# Patient Record
Sex: MALE | NOT HISPANIC OR LATINO | Employment: OTHER | URBAN - METROPOLITAN AREA
[De-identification: names, ages, dates, MRNs, and addresses within clinical notes are randomized per-mention and may not be internally consistent; named-entity substitution may affect disease eponyms.]

---

## 2021-01-27 ENCOUNTER — OFFICE VISIT (OUTPATIENT)
Dept: URGENT CARE | Facility: CLINIC | Age: 71
End: 2021-01-27
Payer: MEDICARE

## 2021-01-27 VITALS
DIASTOLIC BLOOD PRESSURE: 100 MMHG | SYSTOLIC BLOOD PRESSURE: 158 MMHG | HEART RATE: 90 BPM | TEMPERATURE: 98 F | WEIGHT: 225.4 LBS | RESPIRATION RATE: 20 BRPM | OXYGEN SATURATION: 98 %

## 2021-01-27 DIAGNOSIS — I10 HYPERTENSION, UNSPECIFIED TYPE: Primary | ICD-10-CM

## 2021-01-27 PROCEDURE — G0463 HOSPITAL OUTPT CLINIC VISIT: HCPCS | Performed by: PHYSICIAN ASSISTANT

## 2021-01-27 PROCEDURE — 99203 OFFICE O/P NEW LOW 30 MIN: CPT | Performed by: PHYSICIAN ASSISTANT

## 2021-01-27 RX ORDER — ASPIRIN 81 MG/1
81 TABLET, CHEWABLE ORAL DAILY
COMMUNITY

## 2021-01-27 RX ORDER — SIMVASTATIN 40 MG
40 TABLET ORAL
COMMUNITY

## 2021-01-27 RX ORDER — METOPROLOL SUCCINATE 50 MG/1
50 TABLET, EXTENDED RELEASE ORAL DAILY
COMMUNITY

## 2021-01-27 NOTE — PROGRESS NOTES
[address] [employer] [address]          NAME: Edith Young is a 79 y o  male  : 1950    MRN: [id number]  DATE: 2021  TIME: 11:14 AM    Assessment and Plan   Hypertension, unspecified type [I10]  1  Hypertension, unspecified type         Patient Instructions   Educated on concerning symptoms such as blurred vision, HA, numbness, CP or SOB and to present to ER if these develop  Over 180/110 to present to ER  Otherwise to call Cardio today for further workup and possible med adjustment  Patient agreeable to plan  To present to the ER if symptoms worsen  Chief Complaint     Chief Complaint   Patient presents with    Hypertension     on BP meds , took BP in am elevated denies Blurred vision , HA, numbness, tingling          History of Present Illness   Edith Young presents to the clinic c/o    Hx of triple bipass 1197   2 stents placed   one stent placed  Follows with [facility] cardio  Noticed elevated BP readings ranging from 180/107 to 143/92 yesterday and today  Takes metroprol  Hypertension  This is a chronic problem  The current episode started today  The problem has been gradually worsening since onset  Pertinent negatives include no anxiety, blurred vision, chest pain, headaches, malaise/fatigue, neck pain, orthopnea, palpitations, peripheral edema, PND, shortness of breath or sweats  Past treatments include ACE inhibitors  Review of Systems   Review of Systems   Constitutional: Negative for chills, diaphoresis, fatigue, fever and malaise/fatigue  HENT: Negative for congestion, ear discharge, ear pain and facial swelling  Eyes: Negative for blurred vision, photophobia, pain, discharge, redness, itching and visual disturbance  Respiratory: Negative for apnea, cough, chest tightness, shortness of breath and wheezing  Cardiovascular: Negative for chest pain, palpitations, orthopnea and PND  Gastrointestinal: Negative for abdominal pain     Musculoskeletal: Negative for neck pain  Skin: Negative for color change, rash and wound  Neurological: Negative for dizziness and headaches  Hematological: Negative for adenopathy           Current Medications     Long-Term Medications   Medication Sig Dispense Refill    aspirin 81 mg chewable tablet Chew 81 mg daily      metoprolol succinate (TOPROL-XL) 50 mg 24 hr tablet Take 50 mg by mouth daily      simvastatin (ZOCOR) 40 mg tablet Take 40 mg by mouth daily at bedtime         Current Allergies     Allergies as of 2021    (No Known Allergies)            The following portions of the patient's history were reviewed and updated as appropriate: allergies, current medications, past family history, past medical history, past social history, past surgical history and problem list   Past Medical History:   Diagnosis Date    Hypercholesterolemia     Hypertension      Past Surgical History:   Procedure Laterality Date    CORONARY ARTERY BYPASS GRAFT       Social History     Socioeconomic History    Marital status: /Civil Union     Spouse name: Not on file    Number of children: Not on file    Years of education: Not on file    Highest education level: Not on file   Occupational History    Not on file   Social Needs    Financial resource strain: Not on file    Food insecurity     Worry: Not on file     Inability: Not on file   [employer] needs     Medical: Not on file     Non-medical: Not on file   Tobacco Use    Smoking status: Former Smoker     Quit date:      Years since quittin 0    Smokeless tobacco: Never Used   Substance and Sexual Activity    Alcohol use: Yes     Frequency: Monthly or less    Drug use: Never    Sexual activity: Not on file   Lifestyle    Physical activity     Days per week: Not on file     Minutes per session: Not on file    Stress: Not on file   Relationships    Social connections     Talks on phone: Not on file     Gets together: Not on file     Attends Sabianism service: Not on file     Active member of club or organization: Not on file     Attends meetings of clubs or organizations: Not on file     Relationship status: Not on file    Intimate partner violence     Fear of current or ex partner: Not on file     Emotionally abused: Not on file     Physically abused: Not on file     Forced sexual activity: Not on file   Other Topics Concern    Not on file   Social History Narrative    Not on file       Objective   /100   Pulse 90   Temp 98 °F (36 7 °C)   Resp 20   Wt 102 kg (225 lb 6 4 oz)   SpO2 98%      Physical Exam     Physical Exam  Vitals signs and nursing note reviewed  Constitutional:       General: He is not in acute distress  Appearance: He is well-developed  He is not diaphoretic  HENT:      Head: Normocephalic and atraumatic  Right Ear: External ear normal       Left Ear: External ear normal       Nose: Nose normal       Mouth/Throat:      Mouth: Mucous membranes are moist       Pharynx: Oropharynx is clear  No oropharyngeal exudate or posterior oropharyngeal erythema  Eyes:      General: No scleral icterus  Right eye: No discharge  Left eye: No discharge  Conjunctiva/sclera: Conjunctivae normal       Pupils: Pupils are equal, round, and reactive to light  Cardiovascular:      Rate and Rhythm: Normal rate and regular rhythm  Heart sounds: Normal heart sounds  No murmur  No friction rub  No gallop  Pulmonary:      Effort: Pulmonary effort is normal  No respiratory distress  Breath sounds: Normal breath sounds  No decreased breath sounds, wheezing, rhonchi or rales  Abdominal:      General: Bowel sounds are normal       Palpations: Abdomen is soft  Tenderness: There is no abdominal tenderness  Skin:     General: Skin is warm and dry  Coloration: Skin is not pale  Findings: No erythema or rash     Neurological:      Mental Status: He is alert and oriented to person, place, and time       Cranial Nerves: Cranial nerves are intact  Sensory: Sensation is intact  Coordination: Coordination is intact  Psychiatric:         Behavior: Behavior normal          Thought Content:  Thought content normal          Judgment: Judgment normal          Tonia Norris PA-C

## 2021-02-14 ENCOUNTER — APPOINTMENT (EMERGENCY)
Dept: CT IMAGING | Facility: HOSPITAL | Age: 71
End: 2021-02-14
Payer: MEDICARE

## 2021-02-14 ENCOUNTER — HOSPITAL ENCOUNTER (OUTPATIENT)
Facility: HOSPITAL | Age: 71
Setting detail: OBSERVATION
Discharge: HOME | End: 2021-02-15
Attending: EMERGENCY MEDICINE | Admitting: SURGERY
Payer: MEDICARE

## 2021-02-14 DIAGNOSIS — K37 APPENDICITIS: Primary | ICD-10-CM

## 2021-02-14 LAB
ALBUMIN SERPL BCP-MCNC: 3.5 G/DL (ref 3.5–5)
ALP SERPL-CCNC: 90 U/L (ref 46–116)
ALT SERPL W P-5'-P-CCNC: 33 U/L (ref 12–78)
ANION GAP SERPL CALCULATED.3IONS-SCNC: 9 MMOL/L (ref 4–13)
AST SERPL W P-5'-P-CCNC: 14 U/L (ref 5–45)
BACTERIA UR QL AUTO: ABNORMAL /HPF
BASOPHILS # BLD AUTO: 0.05 THOUSANDS/ΜL (ref 0–0.1)
BASOPHILS NFR BLD AUTO: 0 % (ref 0–1)
BILIRUB SERPL-MCNC: 0.44 MG/DL (ref 0.2–1)
BILIRUB UR QL STRIP: NEGATIVE
BUN SERPL-MCNC: 17 MG/DL (ref 5–25)
CALCIUM SERPL-MCNC: 9.3 MG/DL (ref 8.3–10.1)
CHLORIDE SERPL-SCNC: 99 MMOL/L (ref 100–108)
CLARITY UR: CLEAR
CO2 SERPL-SCNC: 29 MMOL/L (ref 21–32)
COLOR UR: YELLOW
CREAT SERPL-MCNC: 1.05 MG/DL (ref 0.6–1.3)
EOSINOPHIL # BLD AUTO: 0.08 THOUSAND/ΜL (ref 0–0.61)
EOSINOPHIL NFR BLD AUTO: 1 % (ref 0–6)
ERYTHROCYTE [DISTWIDTH] IN BLOOD BY AUTOMATED COUNT: 13.3 % (ref 11.6–15.1)
GFR SERPL CREATININE-BSD FRML MDRD: 72 ML/MIN/1.73SQ M
GLUCOSE SERPL-MCNC: 115 MG/DL (ref 65–140)
GLUCOSE UR STRIP-MCNC: NEGATIVE MG/DL
HCT VFR BLD AUTO: 42.7 % (ref 36.5–49.3)
HGB BLD-MCNC: 13.5 G/DL (ref 12–17)
HGB UR QL STRIP.AUTO: ABNORMAL
IMM GRANULOCYTES # BLD AUTO: 0.09 THOUSAND/UL (ref 0–0.2)
IMM GRANULOCYTES NFR BLD AUTO: 1 % (ref 0–2)
KETONES UR STRIP-MCNC: NEGATIVE MG/DL
LEUKOCYTE ESTERASE UR QL STRIP: NEGATIVE
LIPASE SERPL-CCNC: 102 U/L (ref 73–393)
LYMPHOCYTES # BLD AUTO: 1.66 THOUSANDS/ΜL (ref 0.6–4.47)
LYMPHOCYTES NFR BLD AUTO: 13 % (ref 14–44)
MCH RBC QN AUTO: 28.7 PG (ref 26.8–34.3)
MCHC RBC AUTO-ENTMCNC: 31.6 G/DL (ref 31.4–37.4)
MCV RBC AUTO: 91 FL (ref 82–98)
MONOCYTES # BLD AUTO: 1.43 THOUSAND/ΜL (ref 0.17–1.22)
MONOCYTES NFR BLD AUTO: 11 % (ref 4–12)
NEUTROPHILS # BLD AUTO: 9.41 THOUSANDS/ΜL (ref 1.85–7.62)
NEUTS SEG NFR BLD AUTO: 74 % (ref 43–75)
NITRITE UR QL STRIP: NEGATIVE
NON-SQ EPI CELLS URNS QL MICRO: ABNORMAL /HPF
NRBC BLD AUTO-RTO: 0 /100 WBCS
PH UR STRIP.AUTO: 6 [PH] (ref 4.5–8)
PLATELET # BLD AUTO: 240 THOUSANDS/UL (ref 149–390)
PMV BLD AUTO: 10.8 FL (ref 8.9–12.7)
POTASSIUM SERPL-SCNC: 4.4 MMOL/L (ref 3.5–5.3)
PROT SERPL-MCNC: 8 G/DL (ref 6.4–8.2)
PROT UR STRIP-MCNC: NEGATIVE MG/DL
RBC # BLD AUTO: 4.7 MILLION/UL (ref 3.88–5.62)
RBC #/AREA URNS AUTO: ABNORMAL /HPF
SODIUM SERPL-SCNC: 137 MMOL/L (ref 136–145)
SP GR UR STRIP.AUTO: 1.02 (ref 1–1.03)
UROBILINOGEN UR QL STRIP.AUTO: 0.2 E.U./DL
WBC # BLD AUTO: 12.72 THOUSAND/UL (ref 4.31–10.16)
WBC #/AREA URNS AUTO: ABNORMAL /HPF

## 2021-02-14 PROCEDURE — 80053 COMPREHEN METABOLIC PANEL: CPT | Performed by: PHYSICIAN ASSISTANT

## 2021-02-14 PROCEDURE — 99285 EMERGENCY DEPT VISIT HI MDM: CPT | Performed by: PHYSICIAN ASSISTANT

## 2021-02-14 PROCEDURE — 74177 CT ABD & PELVIS W/CONTRAST: CPT

## 2021-02-14 PROCEDURE — 96374 THER/PROPH/DIAG INJ IV PUSH: CPT

## 2021-02-14 PROCEDURE — 1123F ACP DISCUSS/DSCN MKR DOCD: CPT | Performed by: PHYSICIAN ASSISTANT

## 2021-02-14 PROCEDURE — G1004 CDSM NDSC: HCPCS

## 2021-02-14 PROCEDURE — 83690 ASSAY OF LIPASE: CPT | Performed by: PHYSICIAN ASSISTANT

## 2021-02-14 PROCEDURE — 85025 COMPLETE CBC W/AUTO DIFF WBC: CPT | Performed by: PHYSICIAN ASSISTANT

## 2021-02-14 PROCEDURE — 99285 EMERGENCY DEPT VISIT HI MDM: CPT

## 2021-02-14 PROCEDURE — 81001 URINALYSIS AUTO W/SCOPE: CPT

## 2021-02-14 PROCEDURE — 36415 COLL VENOUS BLD VENIPUNCTURE: CPT | Performed by: PHYSICIAN ASSISTANT

## 2021-02-14 RX ORDER — KETOROLAC TROMETHAMINE 30 MG/ML
15 INJECTION, SOLUTION INTRAMUSCULAR; INTRAVENOUS ONCE
Status: COMPLETED | OUTPATIENT
Start: 2021-02-14 | End: 2021-02-14

## 2021-02-14 RX ADMIN — IOHEXOL 100 ML: 350 INJECTION, SOLUTION INTRAVENOUS at 22:58

## 2021-02-14 RX ADMIN — KETOROLAC TROMETHAMINE 15 MG: 30 INJECTION, SOLUTION INTRAMUSCULAR; INTRAVENOUS at 20:51

## 2021-02-14 RX ADMIN — IOHEXOL 50 ML: 240 INJECTION, SOLUTION INTRATHECAL; INTRAVASCULAR; INTRAVENOUS; ORAL at 22:58

## 2021-02-15 ENCOUNTER — ANESTHESIA EVENT (OUTPATIENT)
Dept: PERIOP | Facility: HOSPITAL | Age: 71
End: 2021-02-15
Payer: MEDICARE

## 2021-02-15 ENCOUNTER — ANESTHESIA (OUTPATIENT)
Dept: PERIOP | Facility: HOSPITAL | Age: 71
End: 2021-02-15
Payer: MEDICARE

## 2021-02-15 VITALS
HEART RATE: 102 BPM | RESPIRATION RATE: 18 BRPM | DIASTOLIC BLOOD PRESSURE: 68 MMHG | SYSTOLIC BLOOD PRESSURE: 138 MMHG | OXYGEN SATURATION: 100 % | WEIGHT: 220.02 LBS | TEMPERATURE: 98 F

## 2021-02-15 VITALS — HEART RATE: 99 BPM

## 2021-02-15 PROBLEM — K37 APPENDICITIS: Status: ACTIVE | Noted: 2021-02-15

## 2021-02-15 PROBLEM — E78.5 HYPERLIPIDEMIA: Status: ACTIVE | Noted: 2021-02-15

## 2021-02-15 PROBLEM — I25.810 CORONARY ARTERY DISEASE INVOLVING CORONARY BYPASS GRAFT OF NATIVE HEART WITHOUT ANGINA PECTORIS: Status: ACTIVE | Noted: 2021-02-15

## 2021-02-15 PROCEDURE — 88304 TISSUE EXAM BY PATHOLOGIST: CPT | Performed by: CLINICAL MEDICAL LABORATORY

## 2021-02-15 PROCEDURE — 99235 HOSP IP/OBS SAME DATE MOD 70: CPT | Performed by: SURGERY

## 2021-02-15 PROCEDURE — 44970 LAPAROSCOPY APPENDECTOMY: CPT | Performed by: SURGERY

## 2021-02-15 PROCEDURE — 99024 POSTOP FOLLOW-UP VISIT: CPT | Performed by: SURGERY

## 2021-02-15 RX ORDER — FENTANYL CITRATE 50 UG/ML
INJECTION, SOLUTION INTRAMUSCULAR; INTRAVENOUS AS NEEDED
Status: DISCONTINUED | OUTPATIENT
Start: 2021-02-15 | End: 2021-02-15

## 2021-02-15 RX ORDER — ONDANSETRON 2 MG/ML
4 INJECTION INTRAMUSCULAR; INTRAVENOUS ONCE AS NEEDED
Status: DISCONTINUED | OUTPATIENT
Start: 2021-02-15 | End: 2021-02-15 | Stop reason: HOSPADM

## 2021-02-15 RX ORDER — METRONIDAZOLE 500 MG/1
500 TABLET ORAL EVERY 8 HOURS SCHEDULED
Qty: 21 TABLET | Refills: 0 | Status: SHIPPED | OUTPATIENT
Start: 2021-02-15 | End: 2021-02-22

## 2021-02-15 RX ORDER — AMOXICILLIN AND CLAVULANATE POTASSIUM 875; 125 MG/1; MG/1
1 TABLET, FILM COATED ORAL EVERY 12 HOURS SCHEDULED
Qty: 14 TABLET | Refills: 0 | Status: SHIPPED | OUTPATIENT
Start: 2021-02-15 | End: 2021-02-22

## 2021-02-15 RX ORDER — PRAVASTATIN SODIUM 40 MG
80 TABLET ORAL
Status: DISCONTINUED | OUTPATIENT
Start: 2021-02-15 | End: 2021-02-15 | Stop reason: HOSPADM

## 2021-02-15 RX ORDER — PROPOFOL 10 MG/ML
INJECTION, EMULSION INTRAVENOUS AS NEEDED
Status: DISCONTINUED | OUTPATIENT
Start: 2021-02-15 | End: 2021-02-15

## 2021-02-15 RX ORDER — SODIUM CHLORIDE, SODIUM LACTATE, POTASSIUM CHLORIDE, CALCIUM CHLORIDE 600; 310; 30; 20 MG/100ML; MG/100ML; MG/100ML; MG/100ML
125 INJECTION, SOLUTION INTRAVENOUS CONTINUOUS
Status: DISCONTINUED | OUTPATIENT
Start: 2021-02-15 | End: 2021-02-15 | Stop reason: HOSPADM

## 2021-02-15 RX ORDER — OXYCODONE HYDROCHLORIDE 5 MG/1
5 TABLET ORAL EVERY 4 HOURS PRN
Status: DISCONTINUED | OUTPATIENT
Start: 2021-02-15 | End: 2021-02-15 | Stop reason: HOSPADM

## 2021-02-15 RX ORDER — CEFAZOLIN SODIUM 2 G/50ML
SOLUTION INTRAVENOUS AS NEEDED
Status: DISCONTINUED | OUTPATIENT
Start: 2021-02-15 | End: 2021-02-15

## 2021-02-15 RX ORDER — ACETAMINOPHEN 325 MG/1
650 TABLET ORAL EVERY 6 HOURS PRN
Status: DISCONTINUED | OUTPATIENT
Start: 2021-02-15 | End: 2021-02-15 | Stop reason: HOSPADM

## 2021-02-15 RX ORDER — BUPIVACAINE HYDROCHLORIDE 2.5 MG/ML
INJECTION, SOLUTION EPIDURAL; INFILTRATION; INTRACAUDAL AS NEEDED
Status: DISCONTINUED | OUTPATIENT
Start: 2021-02-15 | End: 2021-02-15 | Stop reason: HOSPADM

## 2021-02-15 RX ORDER — OXYCODONE HYDROCHLORIDE 5 MG/1
5 TABLET ORAL EVERY 4 HOURS PRN
Qty: 20 TABLET | Refills: 0 | Status: SHIPPED | OUTPATIENT
Start: 2021-02-15 | End: 2021-02-25

## 2021-02-15 RX ORDER — CEFAZOLIN SODIUM 2 G/50ML
2000 SOLUTION INTRAVENOUS EVERY 8 HOURS
Status: DISCONTINUED | OUTPATIENT
Start: 2021-02-15 | End: 2021-02-15 | Stop reason: HOSPADM

## 2021-02-15 RX ORDER — METOPROLOL SUCCINATE 50 MG/1
50 TABLET, EXTENDED RELEASE ORAL DAILY
Status: DISCONTINUED | OUTPATIENT
Start: 2021-02-15 | End: 2021-02-15 | Stop reason: HOSPADM

## 2021-02-15 RX ORDER — SODIUM CHLORIDE, SODIUM LACTATE, POTASSIUM CHLORIDE, CALCIUM CHLORIDE 600; 310; 30; 20 MG/100ML; MG/100ML; MG/100ML; MG/100ML
INJECTION, SOLUTION INTRAVENOUS CONTINUOUS PRN
Status: DISCONTINUED | OUTPATIENT
Start: 2021-02-15 | End: 2021-02-15

## 2021-02-15 RX ORDER — ONDANSETRON 2 MG/ML
INJECTION INTRAMUSCULAR; INTRAVENOUS AS NEEDED
Status: DISCONTINUED | OUTPATIENT
Start: 2021-02-15 | End: 2021-02-15

## 2021-02-15 RX ORDER — HYDROMORPHONE HCL/PF 1 MG/ML
0.5 SYRINGE (ML) INJECTION
Status: DISCONTINUED | OUTPATIENT
Start: 2021-02-15 | End: 2021-02-15 | Stop reason: HOSPADM

## 2021-02-15 RX ORDER — SODIUM CHLORIDE 9 MG/ML
INJECTION, SOLUTION INTRAVENOUS CONTINUOUS PRN
Status: DISCONTINUED | OUTPATIENT
Start: 2021-02-15 | End: 2021-02-15

## 2021-02-15 RX ORDER — MIDAZOLAM HYDROCHLORIDE 2 MG/2ML
INJECTION, SOLUTION INTRAMUSCULAR; INTRAVENOUS AS NEEDED
Status: DISCONTINUED | OUTPATIENT
Start: 2021-02-15 | End: 2021-02-15

## 2021-02-15 RX ORDER — FENTANYL CITRATE/PF 50 MCG/ML
50 SYRINGE (ML) INJECTION
Status: DISCONTINUED | OUTPATIENT
Start: 2021-02-15 | End: 2021-02-15 | Stop reason: HOSPADM

## 2021-02-15 RX ORDER — KETOROLAC TROMETHAMINE 30 MG/ML
INJECTION, SOLUTION INTRAMUSCULAR; INTRAVENOUS AS NEEDED
Status: DISCONTINUED | OUTPATIENT
Start: 2021-02-15 | End: 2021-02-15

## 2021-02-15 RX ORDER — ROCURONIUM BROMIDE 10 MG/ML
INJECTION, SOLUTION INTRAVENOUS AS NEEDED
Status: DISCONTINUED | OUTPATIENT
Start: 2021-02-15 | End: 2021-02-15

## 2021-02-15 RX ORDER — SUCCINYLCHOLINE/SOD CL,ISO/PF 100 MG/5ML
SYRINGE (ML) INTRAVENOUS AS NEEDED
Status: DISCONTINUED | OUTPATIENT
Start: 2021-02-15 | End: 2021-02-15

## 2021-02-15 RX ADMIN — SODIUM CHLORIDE, SODIUM LACTATE, POTASSIUM CHLORIDE, AND CALCIUM CHLORIDE: .6; .31; .03; .02 INJECTION, SOLUTION INTRAVENOUS at 09:46

## 2021-02-15 RX ADMIN — ONDANSETRON 4 MG: 2 INJECTION INTRAMUSCULAR; INTRAVENOUS at 09:35

## 2021-02-15 RX ADMIN — METRONIDAZOLE 500 MG: 500 INJECTION, SOLUTION INTRAVENOUS at 16:01

## 2021-02-15 RX ADMIN — OXYCODONE HYDROCHLORIDE 5 MG: 5 TABLET ORAL at 16:00

## 2021-02-15 RX ADMIN — ROCURONIUM BROMIDE 30 MG: 10 INJECTION, SOLUTION INTRAVENOUS at 09:43

## 2021-02-15 RX ADMIN — CEFAZOLIN SODIUM 2000 MG: 2 SOLUTION INTRAVENOUS at 00:44

## 2021-02-15 RX ADMIN — KETOROLAC TROMETHAMINE 15 MG: 30 INJECTION, SOLUTION INTRAMUSCULAR at 10:51

## 2021-02-15 RX ADMIN — PROPOFOL 150 MG: 10 INJECTION, EMULSION INTRAVENOUS at 09:30

## 2021-02-15 RX ADMIN — ROCURONIUM BROMIDE 20 MG: 10 INJECTION, SOLUTION INTRAVENOUS at 10:10

## 2021-02-15 RX ADMIN — CEFAZOLIN SODIUM 2000 MG: 2 SOLUTION INTRAVENOUS at 09:12

## 2021-02-15 RX ADMIN — ROCURONIUM BROMIDE 5 MG: 10 INJECTION, SOLUTION INTRAVENOUS at 09:30

## 2021-02-15 RX ADMIN — CEFAZOLIN SODIUM 2000 MG: 2 SOLUTION INTRAVENOUS at 12:58

## 2021-02-15 RX ADMIN — FENTANYL CITRATE 50 MCG: 50 INJECTION, SOLUTION INTRAMUSCULAR; INTRAVENOUS at 09:35

## 2021-02-15 RX ADMIN — SODIUM CHLORIDE, SODIUM LACTATE, POTASSIUM CHLORIDE, AND CALCIUM CHLORIDE 125 ML/HR: .6; .31; .03; .02 INJECTION, SOLUTION INTRAVENOUS at 12:51

## 2021-02-15 RX ADMIN — SODIUM CHLORIDE, SODIUM LACTATE, POTASSIUM CHLORIDE, AND CALCIUM CHLORIDE 125 ML/HR: .6; .31; .03; .02 INJECTION, SOLUTION INTRAVENOUS at 00:44

## 2021-02-15 RX ADMIN — METRONIDAZOLE 500 MG: 500 INJECTION, SOLUTION INTRAVENOUS at 02:00

## 2021-02-15 RX ADMIN — OXYCODONE HYDROCHLORIDE 5 MG: 5 TABLET ORAL at 06:23

## 2021-02-15 RX ADMIN — Medication 500 MG: at 09:20

## 2021-02-15 RX ADMIN — PROPOFOL 50 MG: 10 INJECTION, EMULSION INTRAVENOUS at 09:33

## 2021-02-15 RX ADMIN — SODIUM CHLORIDE: 0.9 INJECTION, SOLUTION INTRAVENOUS at 09:10

## 2021-02-15 RX ADMIN — MIDAZOLAM 2 MG: 1 INJECTION INTRAMUSCULAR; INTRAVENOUS at 09:25

## 2021-02-15 RX ADMIN — FENTANYL CITRATE 50 MCG: 50 INJECTION, SOLUTION INTRAMUSCULAR; INTRAVENOUS at 09:30

## 2021-02-15 RX ADMIN — Medication 100 MG: at 09:30

## 2021-02-15 RX ADMIN — METOPROLOL SUCCINATE 50 MG: 50 TABLET, EXTENDED RELEASE ORAL at 13:02

## 2021-02-15 RX ADMIN — PRAVASTATIN SODIUM 80 MG: 40 TABLET ORAL at 16:00

## 2021-02-15 NOTE — PLAN OF CARE
Problem: Potential for Falls  Goal: Patient will remain free of falls  Description: INTERVENTIONS:  - Assess patient frequently for physical needs  -  Identify cognitive and physical deficits and behaviors that affect risk of falls    -  [place] fall precautions as indicated by assessment   - Educate patient/family on patient safety including physical limitations  - Instruct patient to call for assistance with activity based on assessment  - Modify environment to reduce risk of injury  - Consider OT/PT consult to assist with strengthening/mobility  Outcome: Progressing     Problem: PAIN - ADULT  Goal: Verbalizes/displays adequate comfort level or baseline comfort level  Description: Interventions:  - Encourage patient to monitor pain and request assistance  - Assess pain using appropriate pain scale  - Administer analgesics based on type and severity of pain and evaluate response  - Implement non-pharmacological measures as appropriate and evaluate response  - Consider cultural and social influences on pain and pain management  - Notify physician/advanced practitioner if interventions unsuccessful or patient reports new pain  Outcome: Progressing     Problem: INFECTION - ADULT  Goal: Absence or prevention of progression during hospitalization  Description: INTERVENTIONS:  - Assess and monitor for signs and symptoms of infection  - Monitor lab/diagnostic results  - Monitor all insertion sites, i e  indwelling lines, tubes, and drains  - Monitor endotracheal if appropriate and nasal secretions for changes in amount and color  - [place] appropriate cooling/warming therapies per order  - Administer medications as ordered  - Instruct and encourage patient and family to use good hand hygiene technique  - Identify and instruct in appropriate isolation precautions for identified infection/condition  Outcome: Progressing  Goal: Absence of fever/infection during neutropenic period  Description: INTERVENTIONS:  - Monitor WBC    Outcome: Progressing     Problem: SAFETY ADULT  Goal: Patient will remain free of falls  Description: INTERVENTIONS:  - Assess patient frequently for physical needs  -  Identify cognitive and physical deficits and behaviors that affect risk of falls    -  [place] fall precautions as indicated by assessment   - Educate patient/family on patient safety including physical limitations  - Instruct patient to call for assistance with activity based on assessment  - Modify environment to reduce risk of injury  - Consider OT/PT consult to assist with strengthening/mobility  Outcome: Progressing  Goal: Maintain or return to baseline ADL function  Description: INTERVENTIONS:  -  Assess patient's ability to carry out ADLs; assess patient's baseline for ADL function and identify physical deficits which impact ability to perform ADLs (bathing, care of mouth/teeth, toileting, grooming, dressing, etc )  - Assess/evaluate cause of self-care deficits   - Assess range of motion  - Assess patient's mobility; develop plan if impaired  - Assess patient's need for assistive devices and provide as appropriate  - Encourage maximum independence but intervene and supervise when necessary  - Involve family in performance of ADLs  - Assess for home care needs following discharge   - Consider OT consult to assist with ADL evaluation and planning for discharge  - Provide patient education as appropriate  Outcome: Progressing  Goal: Maintain or return mobility status to optimal level  Description: INTERVENTIONS:  - Assess patient's baseline mobility status (ambulation, transfers, stairs, etc )    - Identify cognitive and physical deficits and behaviors that affect mobility  - Identify mobility aids required to assist with transfers and/or ambulation (gait belt, sit-to-stand, lift, walker, cane, etc )  - [place] fall precautions as indicated by assessment  - Record patient progress and toleration of activity level on Mobility SBAR; progress patient to next Phase/Stage  - Instruct patient to call for assistance with activity based on assessment  - Consider rehabilitation consult to assist with strengthening/weightbearing, etc   Outcome: Progressing     Problem: DISCHARGE PLANNING  Goal: Discharge to home or other facility with appropriate resources  Description: INTERVENTIONS:  - Identify barriers to discharge w/patient and caregiver  - Arrange for needed discharge resources and transportation as appropriate  - Identify discharge learning needs (meds, wound care, etc )  - Arrange for interpretive services to assist at discharge as needed  - Refer to Case Management Department for coordinating discharge planning if the patient needs post-hospital services based on physician/advanced practitioner order or complex needs related to functional status, cognitive ability, or social support system  Outcome: Progressing     Problem: Knowledge Deficit  Goal: Patient/family/caregiver demonstrates understanding of disease process, treatment plan, medications, and discharge instructions  Description: Complete learning assessment and assess knowledge base    Interventions:  - Provide teaching at level of understanding  - Provide teaching via preferred learning methods  Outcome: Progressing     Problem: GASTROINTESTINAL - ADULT  Goal: Minimal or absence of nausea and/or vomiting  Description: INTERVENTIONS:  - Administer IV fluids if ordered to ensure adequate hydration  - Maintain NPO status until nausea and vomiting are resolved  - Nasogastric tube if ordered  - Administer ordered antiemetic medications as needed  - Provide nonpharmacologic comfort measures as appropriate  - Advance diet as tolerated, if ordered  - Consider nutrition services referral to assist patient with adequate nutrition and appropriate food choices  Outcome: Progressing  Goal: Maintains or returns to baseline bowel function  Description: INTERVENTIONS:  - Assess bowel function  - Encourage oral fluids to ensure adequate hydration  - Administer IV fluids if ordered to ensure adequate hydration  - Administer ordered medications as needed  - Encourage mobilization and activity  - Consider nutritional services referral to assist patient with adequate nutrition and appropriate food choices  Outcome: Progressing  Goal: Maintains adequate nutritional intake  Description: INTERVENTIONS:  - Monitor percentage of each meal consumed  - Identify factors contributing to decreased intake, treat as appropriate  - Assist with meals as needed  - Monitor I&O, weight, and lab values if indicated  - Obtain nutrition services referral as needed  Outcome: Progressing

## 2021-02-15 NOTE — H&P
H&P Exam - General Surgery   Fahad Allen 79 y o  male MRN: [id number]  Unit/Bed#: ED 05 Encounter: [id number]    Assessment/Plan     Assessment:  69-year-old male with uncomplicated appendicitis  Plan:  Admission  NPO  IV fluid  IV antibiotics  EKG  Will scheduled surgery tomorrow    History of Present Illness     HPI:  Fahad Allen is a 79 y o  male with history of CAD status post CABG more than 20 years ago who presented to ED for 4 day history of right lower quadrant pain  He stated it has not been progressive note improved, but for he presented to ED today  He denies fever  He also states he has been constipated and had bowel movement was yesterday but small  He denies chest pain, shortness of breath when he walks, or cough  His vital signs are stable and labs showed elevated white cell, and CT scans showed uncomplicated appendicitis  Therefore the General surgery was consulted  Review of Systems   All other systems reviewed and are negative        Historical Information   Past Medical History:   Diagnosis Date    Hypercholesterolemia     Hypertension      Past Surgical History:   Procedure Laterality Date    CORONARY ARTERY BYPASS GRAFT       Social History   Social History     Substance and Sexual Activity   Alcohol Use Yes    Frequency: Monthly or less     Social History     Substance and Sexual Activity   Drug Use Never     Social History     Tobacco Use   Smoking Status Former Smoker    Quit date:     Years since quittin 1   Smokeless Tobacco Never Used     E-Cigarette/Vaping    E-Cigarette Use Never User      E-Cigarette/Vaping Substances    Nicotine No     THC No     CBD No     Flavoring No     Other No     Unknown No      Family History: non-contributory    Meds/Allergies   all medications and allergies reviewed  No Known Allergies    Objective   First Vitals:   Blood Pressure: 153/71 (21)  Pulse: 96 (21)  Temperature: 98 4 °F (36 9 °C) (02/14/21 2003)  Temp Source: Oral (02/14/21 2003)  Respirations: 18 (02/14/21 2003)  Weight - Scale: 99 1 kg (218 lb 7 6 oz) (02/14/21 2003)  SpO2: 97 % (02/14/21 2003)    Current Vitals:   Blood Pressure: 121/78 (02/14/21 2215)  Pulse: 93 (02/14/21 2215)  Temperature: 98 4 °F (36 9 °C) (02/14/21 2003)  Temp Source: Oral (02/14/21 2003)  Respirations: 16 (02/14/21 2215)  Weight - Scale: 99 1 kg (218 lb 7 6 oz) (02/14/21 2003)  SpO2: 98 % (02/14/21 2215)    No intake or output data in the 24 hours ending 02/14/21 2358    Invasive Devices     Peripheral Intravenous Line            Peripheral IV 02/14/21 Left Antecubital less than 1 day                Physical Exam  Vitals signs and nursing note reviewed  Constitutional:       Appearance: He is normal weight  HENT:      Head: Normocephalic  Mouth/Throat:      Mouth: Mucous membranes are moist    Eyes:      Extraocular Movements: Extraocular movements intact  Cardiovascular:      Rate and Rhythm: Normal rate and regular rhythm  Heart sounds: Normal heart sounds  Pulmonary:      Effort: Pulmonary effort is normal       Breath sounds: Normal breath sounds  Abdominal:      General: Abdomen is flat  Bowel sounds are normal       Palpations: Abdomen is soft  Tenderness: There is abdominal tenderness in the right lower quadrant  There is no guarding or rebound  Positive signs include Mcduffie's sign  Skin:     General: Skin is warm  Capillary Refill: Capillary refill takes less than 2 seconds  Neurological:      General: No focal deficit present  Mental Status: He is alert and oriented to person, place, and time  Psychiatric:         Mood and Affect: Mood normal          Behavior: Behavior normal          Lab Results: I have personally reviewed pertinent lab results  Imaging: I have personally reviewed pertinent reports  EKG, Pathology, and Other Studies: I have personally reviewed pertinent reports        Code Status: No Order  Advance Directive and Living Will:      Power of :    POLST:      Counseling / Coordination of Care  Total floor / unit time spent today 30 minutes  Greater than 50% of total time was spent with the patient and / or family counseling and / or coordination of care  A description of the counseling / coordination of care: Payal Mtz

## 2021-02-15 NOTE — OP NOTE
OPERATIVE REPORT  PATIENT NAME: Jose De Jesus Drummond    :  1950  MRN: [id number]  Pt Location: [facility] OR ROOM 06    SURGERY DATE: 2/15/2021    Surgeon(s) and Role:     * Russel Sullivan MD - Primary     * Johann Charles DO - Assisting    Preop Diagnosis:  Appendicitis [K37]    Post-Op Diagnosis Codes:     * Appendicitis [K37]     * Gangrenous appendicitis [K35 891]    Procedure(s) (LRB):  APPENDECTOMY LAPAROSCOPIC (N/A)    Specimen(s):  ID Type Source Tests Collected by Time Destination   1 :  Tissue Appendix TISSUE EXAM Russel Sullivan MD 2/15/2021 [id number]        Estimated Blood Loss:   5 mL    Drains:  NG/OG/Enteral Tube Orogastric 14 Fr Center mouth (Active)   Number of days: 0       Anesthesia Type:   General    Operative Indications:  Appendicitis [K37]      Operative Findings:  Acute appendicitis with gangrene at the tip and small contained abscess    Umbilical hernia repair in the past with mesh    Complications:   None    Procedure and Technique:  The patient was seen again in the Holding Room  The risks, benefits, complications, treatment options, and expected outcomes were discussed with the patient and/or family  The possibilities of reaction to medication, pulmonary aspiration, perforation of viscus, bleeding, recurrent infection, finding a normal appendix, the need for additional procedures, failure to diagnose a condition, and creating a complication requiring transfusion or operation were discussed  There was concurrence with the proposed plan and informed consent was obtained  The site of surgery was properly noted/marked  The patient was taken to Operating Room, identified as Jose De Jesus Drummond and the procedure verified as Appendectomy  A Time Out was held after the prep and draping,  and the above information confirmed  The patient was placed in the supine position and general anesthesia was induced, along with placement of orogastric tube, Venodyne boots, and a Montiel catheter   The abdomen was prepped and draped in a sterile fashion  Used a history of hernia repair with mesh a supraumbilical incision was made and an open technique was used to enter the abdomen  An 12mm port was placed and a pneumoperitoneum created  Additional ports were placed under direct vision in the routine positions  A careful evaluation of the entire abdomen was carried out  The patient was placed in Trendelenburg and left lateral decubitus position  The small intestines were retracted in the cephalad and left lateral direction away from the pelvis and right lower quadrant  There was visualized an  inflamed appendix that was extending into the pelvis  The appendix was contained against the sidewall with the colon and the small intestine  Once these were bluntly dissected off small amount of purulent material was identified  The tip the appendix was gangrenous  The appendix was carefully dissected  The mesentery was dissected with the harmonic scalpel  A endo-JESSY stapler with a blue load was fire at the base of the appendix at the level of the cecum  There was no evidence of bleeding, leakage, or complication after division of the appendix and mesoappendix  Irrigation was also performed and irrigate suctioned from the abdomen as well  The umbilical port site fascia was closed using a 0-Vicryl figure of eight using the renfac suture passer  All skin incisions were closed using MonocryI suture in a subcuticular fashion  The instrument, sponge, and needle counts were correct at the conclusion of the case            I was present for the entire procedure    Patient Disposition:  PACU     SIGNATURE: Tom Rivera MD  DATE: February 15, 2021  TIME: 11:15 AM

## 2021-02-15 NOTE — PLAN OF CARE
Problem: Potential for Falls  Goal: Patient will remain free of falls  Description: INTERVENTIONS:  - Assess patient frequently for physical needs  -  Identify cognitive and physical deficits and behaviors that affect risk of falls    -  [place] fall precautions as indicated by assessment   - Educate patient/family on patient safety including physical limitations  - Instruct patient to call for assistance with activity based on assessment  - Modify environment to reduce risk of injury  - Consider OT/PT consult to assist with strengthening/mobility  Outcome: Progressing     Problem: PAIN - ADULT  Goal: Verbalizes/displays adequate comfort level or baseline comfort level  Description: Interventions:  - Encourage patient to monitor pain and request assistance  - Assess pain using appropriate pain scale  - Administer analgesics based on type and severity of pain and evaluate response  - Implement non-pharmacological measures as appropriate and evaluate response  - Consider cultural and social influences on pain and pain management  - Notify physician/advanced practitioner if interventions unsuccessful or patient reports new pain  Outcome: Progressing     Problem: INFECTION - ADULT  Goal: Absence or prevention of progression during hospitalization  Description: INTERVENTIONS:  - Assess and monitor for signs and symptoms of infection  - Monitor lab/diagnostic results  - Monitor all insertion sites, i e  indwelling lines, tubes, and drains  - Monitor endotracheal if appropriate and nasal secretions for changes in amount and color  - [place] appropriate cooling/warming therapies per order  - Administer medications as ordered  - Instruct and encourage patient and family to use good hand hygiene technique  - Identify and instruct in appropriate isolation precautions for identified infection/condition  Outcome: Progressing  Goal: Absence of fever/infection during neutropenic period  Description: INTERVENTIONS:  - Monitor WBC    Outcome: Progressing     Problem: SAFETY ADULT  Goal: Patient will remain free of falls  Description: INTERVENTIONS:  - Assess patient frequently for physical needs  -  Identify cognitive and physical deficits and behaviors that affect risk of falls    -  [place] fall precautions as indicated by assessment   - Educate patient/family on patient safety including physical limitations  - Instruct patient to call for assistance with activity based on assessment  - Modify environment to reduce risk of injury  - Consider OT/PT consult to assist with strengthening/mobility  Outcome: Progressing  Goal: Maintain or return to baseline ADL function  Description: INTERVENTIONS:  -  Assess patient's ability to carry out ADLs; assess patient's baseline for ADL function and identify physical deficits which impact ability to perform ADLs (bathing, care of mouth/teeth, toileting, grooming, dressing, etc )  - Assess/evaluate cause of self-care deficits   - Assess range of motion  - Assess patient's mobility; develop plan if impaired  - Assess patient's need for assistive devices and provide as appropriate  - Encourage maximum independence but intervene and supervise when necessary  - Involve family in performance of ADLs  - Assess for home care needs following discharge   - Consider OT consult to assist with ADL evaluation and planning for discharge  - Provide patient education as appropriate  Outcome: Progressing  Goal: Maintain or return mobility status to optimal level  Description: INTERVENTIONS:  - Assess patient's baseline mobility status (ambulation, transfers, stairs, etc )    - Identify cognitive and physical deficits and behaviors that affect mobility  - Identify mobility aids required to assist with transfers and/or ambulation (gait belt, sit-to-stand, lift, walker, cane, etc )  - [place] fall precautions as indicated by assessment  - Record patient progress and toleration of activity level on Mobility SBAR; progress patient to next Phase/Stage  - Instruct patient to call for assistance with activity based on assessment  - Consider rehabilitation consult to assist with strengthening/weightbearing, etc   Outcome: Progressing     Problem: DISCHARGE PLANNING  Goal: Discharge to home or other facility with appropriate resources  Description: INTERVENTIONS:  - Identify barriers to discharge w/patient and caregiver  - Arrange for needed discharge resources and transportation as appropriate  - Identify discharge learning needs (meds, wound care, etc )  - Arrange for interpretive services to assist at discharge as needed  - Refer to Case Management Department for coordinating discharge planning if the patient needs post-hospital services based on physician/advanced practitioner order or complex needs related to functional status, cognitive ability, or social support system  Outcome: Progressing     Problem: Knowledge Deficit  Goal: Patient/family/caregiver demonstrates understanding of disease process, treatment plan, medications, and discharge instructions  Description: Complete learning assessment and assess knowledge base    Interventions:  - Provide teaching at level of understanding  - Provide teaching via preferred learning methods  Outcome: Progressing     Problem: GASTROINTESTINAL - ADULT  Goal: Minimal or absence of nausea and/or vomiting  Description: INTERVENTIONS:  - Administer IV fluids if ordered to ensure adequate hydration  - Maintain NPO status until nausea and vomiting are resolved  - Nasogastric tube if ordered  - Administer ordered antiemetic medications as needed  - Provide nonpharmacologic comfort measures as appropriate  - Advance diet as tolerated, if ordered  - Consider nutrition services referral to assist patient with adequate nutrition and appropriate food choices  Outcome: Progressing  Goal: Maintains or returns to baseline bowel function  Description: INTERVENTIONS:  - Assess bowel function  - Encourage oral fluids to ensure adequate hydration  - Administer IV fluids if ordered to ensure adequate hydration  - Administer ordered medications as needed  - Encourage mobilization and activity  - Consider nutritional services referral to assist patient with adequate nutrition and appropriate food choices  Outcome: Progressing  Goal: Maintains adequate nutritional intake  Description: INTERVENTIONS:  - Monitor percentage of each meal consumed  - Identify factors contributing to decreased intake, treat as appropriate  - Assist with meals as needed  - Monitor I&O, weight, and lab values if indicated  - Obtain nutrition services referral as needed  Outcome: Progressing

## 2021-02-15 NOTE — ANESTHESIA POSTPROCEDURE EVALUATION
Post-Op Assessment Note    CV Status:  Stable    Pain management: adequate     Mental Status:  Alert and awake   Hydration Status:  Euvolemic   PONV Controlled:  Controlled   Airway Patency:  Patent  Airway: intubated      Post Op Vitals Reviewed: Yes      Staff: Anesthesiologist, CRNA         No complications documented      BP      Temp      Pulse     Resp      SpO2

## 2021-02-15 NOTE — DISCHARGE SUMMARY
Discharge Summary - General Surgery   Sam Stoll 79 y o  male MRN: [id number]  Unit/Bed#: E2 -01 Encounter: [id number]    Admission Date: 02/14/21  Admission Orders (From admission, onward)     Ordered        02/15/21 0007  Place in Observation  Once                      Discharge Date: 02/15/21    Admitting Diagnosis: Appendicitis [K37]  Abdominal pain [R10 9]    Discharge Diagnosis: s/p laparoscopic appendectomy        Attending: Dr Vanita Contreras      Procedures Performed: laparoscopic appendectomy    Pathology: pending    Hospital Course: 68-yr old male who presented to the ED with a 4-day hx of RLQ pain  Laboratory work-up showed leucocytosis (12,000) and a CT abdomen/pelvis done was in keeping with acute appendicitis  He was taken for a laparoscopic appendectomy with intra-operative finding of a gangrenous appendix  His immediate post-operative course was uneventful and he was discharged home on 02/15/21 to complete a scheduled oral antibiotic course and to follow up with the surgery office  Condition at Discharge: good     Discharge instructions/Information to patient and family:   See after visit summary for information provided to patient and family  Provisions for Follow-Up Care:  See after visit summary for information related to follow-up care and any pertinent home health orders  Disposition: Home          Planned Readmission: No    Discharge Statement   I spent 20 minutes discharging the patient  This time was spent on the day of discharge  I had direct contact with the patient on the day of discharge  Additional documentation is required if more than 30 minutes were spent on discharge  Discharge Medications:  See after visit summary for reconciled discharge medications provided to patient and family

## 2021-02-15 NOTE — QUICK NOTE
POST-OP CHECK    68-yr old male; s/p laparoscopic appendectomy  Stable VS in room air  C/o: mild to moderate incision site pain; responsive to current analgesia regimen  On exam;  - lying comfortably in bed; not in any painful distress  - abdomen appr tender to palpation  - incisions clean/dry and intact  PLAN:  - PO diet as tolerated  - continue antibiotics/analgesics  - possible discharge home later today

## 2021-02-15 NOTE — DISCHARGE INSTRUCTIONS
[facility]  Post-Operative Care Instructions  Dr Liana Ribera MD, [place]  [phone]    1  General: You will feel pulling sensations around the wound or funny aches and pains around the incisions  This is normal  Even minor surgery is a change in your body and this is your bodys way of reaction to it  If you have had abdominal surgery, it may help to support the incision with a small pillow or blanket for comfort when moving or coughing  2  Wound care:  Okay to shower  The glue will fall off over the next week or 2     3  Water: You may shower over the wound, unless there are drain tubes left in place  Do not bathe or use a pool or hot tub until cleared by the physician  4  Activity: You may go up and down stairs, walk as much as you are comfortable, but walk at least 3 times each day  If you have had abdominal surgery, do not lift anything heavier than 20 pounds for at least 4 weeks, unless cleared by the doctor  5  Diet: You may resume a regular diet  If you had a same-day surgery or overnight stay surgery, he may wish to eat lightly for a few days: soups, crackers, and sandwiches  You may resume a regular diet when ready  6  Medications: Resume all of your previous medications, unless told otherwise by the doctor  A good option for pain control is to start with Tylenol and ibuprofen and alternate taking them every 2 hours for 1-2 days  If this is not sufficient then substituted the Tylenol for the narcotic pain medicine prescribed  Insure that you do not take more than 4000 mg of Tylenol per day  You do not need to take the narcotic pain medications unless you are having significant pain and discomfort  7  Driving: You will need someone to drive you home on the day of surgery  Do not drive or make any important decisions while on narcotic pain medication or 24 hours and after anesthesia or sedation for surgery   Generally, you may drive when your off all narcotic pain medications  8  Upset Stomach: You may take Maalox, Tums, or similar items for an upset stomach  If your narcotic pain medication causes an upset stomach, do not take it on an empty stomach  Try taking it with at least some crackers or toast      9  Constipation: Patients often experienced constipation after surgery  You may take over-the-counter medication for this, such as Metamucil, Senokot, Dulcolax, milk of magnesia, etc  You may take a suppository unless you have had anorectal surgery such as a procedure on your hemorrhoids  If you experience significant nausea or vomiting after abdominal surgery, call the office before trying any of these medications  10  Call the office: If you are experiencing any of the following, fevers above 101 5°, significant nausea or vomiting, if the wound develops drainage and/or is excessive redness around the wound, or if you have significant diarrhea or other worsening symptoms  11  Pain: You may be given a prescription for pain  This will be given to the hospital, the day of surgery  12  Sexual Activity: You may resume sexual activity when you feel ready and comfortable and your incision is sealed and healed without apparent infection risk

## 2021-02-15 NOTE — UTILIZATION REVIEW
Initial Clinical Review    Admission: Date/Time/Statement:   Admission Orders (From admission, onward)     Ordered        02/15/21 0007  Place in Observation  Once                   Orders Placed This Encounter   Procedures    Place in Observation     Standing Status:   Standing     Number of Occurrences:   1     Order Specific Question:   Level of Care     Answer:   Med Surg [16]     ED Arrival Information     Expected Arrival Acuity Means of Arrival Escorted By Service Admission Type    - 2/14/2021 19:51 Urgent [facility]-In [facility] Surgery-General Urgent    Arrival Complaint    abdominal pain        Chief Complaint   Patient presents with    Abdominal Pain     since thur with right lower abd pain last fri small bm    Constipation     Assessment/Plan: Assessment:  80-year-old male with uncomplicated appendicitis      Plan:  Admission  NPO  IV fluid  IV antibiotics  EKG  Will scheduled surgery tomorrow    Demetria Nassar is a 79 y o  male with history of CAD status post CABG more than 20 years ago who presented to ED for 4 day history of right lower quadrant pain  He stated it has not been progressive note improved, but for he presented to ED today  He denies fever  He also states he has been constipated and had bowel movement was yesterday but small  He denies chest pain, shortness of breath when he walks, or cough  His vital signs are stable and labs showed elevated white cell, and CT scans showed uncomplicated appendicitis  Therefore the General surgery was consulted      SURGERY DATE: 2/15/2021     Procedure(s) (LRB):  APPENDECTOMY LAPAROSCOPIC (N/A)     Operative Findings:  Acute appendicitis with gangrene at the tip and small contained abscess     Umbilical hernia repair in the past with mesh     ED Triage Vitals [02/14/21 2003]   Temperature Pulse Respirations Blood Pressure SpO2   98 4 °F (36 9 °C) 96 18 153/71 97 %      Temp Source Heart Rate Source Patient Position - Orthostatic VS BP Location FiO2 (%)   Oral Monitor Sitting Right arm --      Pain Score       3          Wt Readings from Last 1 Encounters:   02/15/21 99 8 kg (220 lb 0 3 oz)     Additional Vital Signs:   02/15/21 0109  97 5 °F (36 4 °C)  82  18  124/82  98 %  --  Lying   02/15/21 0016  --  89  18  152/84  98 %  None (Room air)  Lying   02/14/21 2215  --  93  16  121/78  98 %  None (Room air)  --   02/14/21 2050  --  --  --  --  --  None (Room air)  --   02/14/21 2003  98 4 °F (36 9 °C)  96  18  153/71  97 %  None (Room air)  Sitting           Pertinent Labs/Diagnostic Test Results:   CT  Abd/pelvis   ( 2/14)      1   Dilated appendix with surrounding fat stranding and fluid compatible with appendicitis   The appendiceal wall is hyperemic however there is focal hypoenhancement of the appendiceal tip which may indicate impending rupture  2   The adjacent proximal sigmoid colon is focally thickened, likely reactive     3   Multiple nodules in the left adrenal gland the largest measuring 2 8 cm, indeterminate   Further evaluation with MRI of the abdomen with and without contrast with adrenal mass protocol is recommended      Results from last 7 days   Lab Units 02/14/21 2050   WBC Thousand/uL 12 72*   HEMOGLOBIN g/dL 13 5   HEMATOCRIT % 42 7   PLATELETS Thousands/uL 240   NEUTROS ABS Thousands/µL 9 41*         Results from last 7 days   Lab Units 02/14/21 2050   SODIUM mmol/L 137   POTASSIUM mmol/L 4 4   CHLORIDE mmol/L 99*   CO2 mmol/L 29   ANION GAP mmol/L 9   BUN mg/dL 17   CREATININE mg/dL 1 05   EGFR ml/min/1 73sq m 72   CALCIUM mg/dL 9 3     Results from last 7 days   Lab Units 02/14/21 2050   AST U/L 14   ALT U/L 33   ALK PHOS U/L 90   TOTAL PROTEIN g/dL 8 0   ALBUMIN g/dL 3 5   TOTAL BILIRUBIN mg/dL 0 44         Results from last 7 days   Lab Units 02/14/21 2050   GLUCOSE RANDOM mg/dL 115           Results from last 7 days   Lab Units 02/14/21 2050   LIPASE u/L 102             Results from last 7 days   Lab Units 02/14/21 2104 CLARITY UA  Clear   COLOR UA  Yellow   SPEC GRAV UA  1 025   PH UA  6 0   GLUCOSE UA mg/dl Negative   KETONES UA mg/dl Negative   BLOOD UA  Trace*   PROTEIN UA mg/dl Negative   NITRITE UA  Negative   BILIRUBIN UA  Negative   UROBILINOGEN UA E U /dl 0 2   LEUKOCYTES UA  Negative   WBC UA /hpf 0-1*   RBC UA /hpf None Seen   BACTERIA UA /hpf None Seen   EPITHELIAL CELLS WET PREP /hpf None Seen         ED Treatment:   Medication Administration from 02/14/2021 1951 to 02/15/2021 0031       Date/Time Order Dose Route Action Action by Comments     02/14/2021 2051 ketorolac (TORADOL) injection 15 mg 15 mg Intravenous Given Chico Farfan RN      02/14/2021 2258 iohexol (OMNIPAQUE) 350 MG/ML injection (MULTI-DOSE) 100 mL 100 mL Intravenous Given Brandon Carmen      02/14/2021 2258 iohexol (OMNIPAQUE) 240 MG/ML solution 50 mL 50 mL Oral Given Brandon Carmen         Past Medical History:   Diagnosis Date    Hypercholesterolemia     Hypertension      Present on Admission:  **None**      Admitting Diagnosis: Appendicitis [K37]  Abdominal pain [R10 9]  Age/Sex: 79 y o  male  Admission Orders:  Scheduled Medications:  cefazolin, 2,000 mg, Intravenous, Q8H  enoxaparin, 40 mg, Subcutaneous, Daily  metoprolol succinate, 50 mg, Oral, Daily  metroNIDAZOLE, 500 mg, Intravenous, Q8H  pravastatin, 80 mg, Oral, Daily With Dinner      Continuous IV Infusions:  lactated ringers, 125 mL/hr, Intravenous, Continuous      PRN Meds:  HYDROmorphone, 0 5 mg, Intravenous, Q3H PRN  oxyCODONE, 5 mg, Oral, Q4H PRN        None    Network Utilization Review Department  ATTENTION: Please call with any questions or concerns to [phone] and carefully listen to the prompts so that you are directed to the right person   All voicemails are confidential   Rodolfo Cortes all requests for admission clinical reviews, approved or denied determinations and any other requests to dedicated fax number below belonging to the campus where the patient is receiving treatment   List of dedicated fax numbers for the Facilities:  [address] [place] [address] DENIALS (Administrative/Medical Necessity) [phone] [address]  [address] (Maternity/NICU/Pediatrics) [phone] [address] [place] [address] Dr Marc Tucker [phone] (  Anita Shah "Angela" 103) [address] [place] [address] [address] [place] [id number] [phone]

## 2021-02-15 NOTE — ED PROVIDER NOTES
History  Chief Complaint   Patient presents with    Abdominal Pain     since thur with right lower abd pain last fri small bm    Constipation     Patient is a 80 y/o male presenting to the ED for evaluation of RLQ abdominal pain and constipation x4 days  Patient reports generalized abdominal pain shortly after eating chinese food 5 days ago, but states this resolved on its own  The next day he began having pain localized to the RLQ without radiation  The pain has been constant, waxing and waning  He also reports small, hard bowel movements over the last few days, last one was yesterday  He has been taking Miralax and Ex-Lax without relief  He denies fevers/chills, N/V, melena, hematochezia or diarrhea  He also reports urinary frequency/urgency over the same time frame but denies dysuria, hematuria or flank pain  Prior to Admission Medications   Prescriptions Last Dose Informant Patient Reported? Taking?   aspirin 81 mg chewable tablet   Yes No   Sig: Chew 81 mg daily   metoprolol succinate (TOPROL-XL) 50 mg 24 hr tablet  Self Yes No   Sig: Take 50 mg by mouth daily   simvastatin (ZOCOR) 40 mg tablet  Self Yes No   Sig: Take 40 mg by mouth daily at bedtime      Facility-Administered Medications: None       Past Medical History:   Diagnosis Date    Hypercholesterolemia     Hypertension        Past Surgical History:   Procedure Laterality Date    CORONARY ARTERY BYPASS GRAFT         History reviewed  No pertinent family history  I have reviewed and agree with the history as documented      E-Cigarette/Vaping    E-Cigarette Use Never User      E-Cigarette/Vaping Substances    Nicotine No     THC No     CBD No     Flavoring No     Other No     Unknown No      Social History     Tobacco Use    Smoking status: Former Smoker     Quit date:      Years since quittin 1    Smokeless tobacco: Never Used   Substance Use Topics    Alcohol use: Yes     Frequency: Monthly or less    Drug use: Never Review of Systems   Constitutional: Positive for appetite change (decreased)  Negative for chills, diaphoresis, fatigue and fever  HENT: Negative for congestion and sore throat  Eyes: Negative for photophobia and visual disturbance  Respiratory: Negative for cough, shortness of breath, wheezing and stridor  Cardiovascular: Negative for chest pain, palpitations and leg swelling  Gastrointestinal: Positive for abdominal distention (bloated), abdominal pain (RLQ) and constipation  Negative for anal bleeding, blood in stool, diarrhea, nausea, rectal pain and vomiting  Genitourinary: Positive for frequency and urgency  Negative for decreased urine volume, difficulty urinating, dysuria, flank pain and hematuria  Musculoskeletal: Negative for arthralgias, back pain, gait problem, joint swelling, myalgias and neck pain  Skin: Negative for color change, pallor and rash  Neurological: Negative for dizziness, syncope, weakness, light-headedness and headaches  Psychiatric/Behavioral: Negative for confusion and sleep disturbance  All other systems reviewed and are negative  Physical Exam  Physical Exam  Vitals signs and nursing note reviewed  Exam conducted with a chaperone present  Constitutional:       General: He is awake  He is not in acute distress  Appearance: Normal appearance  He is well-developed  He is obese  He is not diaphoretic  HENT:      Head: Normocephalic and atraumatic  Nose: Nose normal    Eyes:      General: Lids are normal       Conjunctiva/sclera: Conjunctivae normal       Pupils: Pupils are equal, round, and reactive to light  Neck:      Musculoskeletal: Normal range of motion and neck supple  Cardiovascular:      Rate and Rhythm: Normal rate and regular rhythm  Pulses: Normal pulses  Heart sounds: Normal heart sounds, S1 normal and S2 normal    Pulmonary:      Effort: Pulmonary effort is normal       Breath sounds: Normal breath sounds   No decreased breath sounds, wheezing, rhonchi or rales  Abdominal:      General: Bowel sounds are normal  There is distension  Palpations: Abdomen is soft  There is no shifting dullness or fluid wave  Tenderness: There is abdominal tenderness in the right lower quadrant and suprapubic area  There is no right CVA tenderness, left CVA tenderness, guarding or rebound  Positive signs include McBurney's sign  Negative signs include Mcduffie's sign and Rovsing's sign  Musculoskeletal:      Right lower leg: No edema  Left lower leg: No edema  Lymphadenopathy:      Cervical: No cervical adenopathy  Skin:     General: Skin is warm and dry  Capillary Refill: Capillary refill takes less than 2 seconds  Coloration: Skin is not cyanotic or pale  Neurological:      Mental Status: He is alert and oriented to person, place, and time  GCS: GCS eye subscore is 4  GCS verbal subscore is 5  GCS motor subscore is 6  Psychiatric:         Attention and Perception: Attention normal          Mood and Affect: Mood normal          Speech: Speech normal          Behavior: Behavior is cooperative           Vital Signs  ED Triage Vitals [02/14/21 2003]   Temperature Pulse Respirations Blood Pressure SpO2   98 4 °F (36 9 °C) 96 18 153/71 97 %      Temp Source Heart Rate Source Patient Position - Orthostatic VS BP Location FiO2 (%)   Oral Monitor Sitting Right arm --      Pain Score       3           Vitals:    02/14/21 2003 02/14/21 2215   BP: 153/71 121/78   Pulse: 96 93   Patient Position - Orthostatic VS: Sitting          Visual Acuity      ED Medications  Medications   lactated ringers infusion (has no administration in time range)   enoxaparin (LOVENOX) subcutaneous injection 40 mg (has no administration in time range)   oxyCODONE (ROXICODONE) IR tablet 5 mg (has no administration in time range)   HYDROmorphone (DILAUDID) injection 0 5 mg (has no administration in time range)   ceFAZolin (ANCEF) IVPB (premix in dextrose) 2,000 mg 50 mL (has no administration in time range)   metroNIDAZOLE (FLAGYL) IVPB (premix) 500 mg 100 mL (has no administration in time range)   metoprolol succinate (TOPROL-XL) 24 hr tablet 50 mg (has no administration in time range)   pravastatin (PRAVACHOL) tablet 80 mg (has no administration in time range)   ketorolac (TORADOL) injection 15 mg (15 mg Intravenous Given 2/14/21 2051)   iohexol (OMNIPAQUE) 350 MG/ML injection (MULTI-DOSE) 100 mL (100 mL Intravenous Given 2/14/21 2258)   iohexol (OMNIPAQUE) 240 MG/ML solution 50 mL (50 mL Oral Given 2/14/21 2258)       Diagnostic Studies  Results Reviewed     Procedure Component Value Units Date/Time    Urine Microscopic [[id number]]  (Abnormal) Collected: 02/14/21 2104    Lab Status: Final result Specimen: Urine, Clean Catch Updated: 02/14/21 2131     RBC, UA None Seen /hpf      WBC, UA 0-1 /hpf      Epithelial Cells None Seen /hpf      Bacteria, UA None Seen /hpf     Comprehensive metabolic panel [[id number]]  (Abnormal) Collected: 02/14/21 2050    Lab Status: Final result Specimen: Blood from Arm, Left Updated: 02/14/21 2118     Sodium 137 mmol/L      Potassium 4 4 mmol/L      Chloride 99 mmol/L      CO2 29 mmol/L      ANION GAP 9 mmol/L      BUN 17 mg/dL      Creatinine 1 05 mg/dL      Glucose 115 mg/dL      Calcium 9 3 mg/dL      AST 14 U/L      ALT 33 U/L      Alkaline Phosphatase 90 U/L      Total Protein 8 0 g/dL      Albumin 3 5 g/dL      Total Bilirubin 0 44 mg/dL      eGFR 72 ml/min/1 73sq m     Narrative:      [place] guidelines for Chronic Kidney Disease (CKD):     Stage 1 with normal or high GFR (GFR > 90 mL/min/1 73 square meters)    Stage 2 Mild CKD (GFR = 60-89 mL/min/1 73 square meters)    Stage 3A Moderate CKD (GFR = 45-59 mL/min/1 73 square meters)    Stage 3B Moderate CKD (GFR = 30-44 mL/min/1 73 square meters)    Stage 4 Severe CKD (GFR = 15-29 mL/min/1 73 square meters)    Stage 5 End Stage CKD (GFR <15 mL/min/1 73 square meters)  Note: GFR calculation is accurate only with a steady state creatinine    Lipase [[id number]]  (Normal) Collected: 02/14/21 2050    Lab Status: Final result Specimen: Blood from Arm, Left Updated: 02/14/21 2118     Lipase 102 u/L     Urine Macroscopic, POC [[id number]]  (Abnormal) Collected: 02/14/21 2104    Lab Status: Final result Specimen: Urine Updated: 02/14/21 2106     Color, UA Yellow     Clarity, UA Clear     pH, UA 6 0     Leukocytes, UA Negative     Nitrite, UA Negative     Protein, UA Negative mg/dl      Glucose, UA Negative mg/dl      Ketones, UA Negative mg/dl      Urobilinogen, UA 0 2 E U /dl      Bilirubin, UA Negative     Blood, UA Trace     Specific [place], UA 1 025    Narrative:      CLINITEK RESULT    CBC and differential [[id number]]  (Abnormal) Collected: 02/14/21 2050    Lab Status: Final result Specimen: Blood from Arm, Left Updated: 02/14/21 2059     WBC 12 72 Thousand/uL      RBC 4 70 Million/uL      Hemoglobin 13 5 g/dL      Hematocrit 42 7 %      MCV 91 fL      MCH 28 7 pg      MCHC 31 6 g/dL      RDW 13 3 %      MPV 10 8 fL      Platelets 971 Thousands/uL      nRBC 0 /100 WBCs      Neutrophils Relative 74 %      Immat GRANS % 1 %      Lymphocytes Relative 13 %      Monocytes Relative 11 %      Eosinophils Relative 1 %      Basophils Relative 0 %      Neutrophils Absolute 9 41 Thousands/µL      Immature Grans Absolute 0 09 Thousand/uL      Lymphocytes Absolute 1 66 Thousands/µL      Monocytes Absolute 1 43 Thousand/µL      Eosinophils Absolute 0 08 Thousand/µL      Basophils Absolute 0 05 Thousands/µL                  CT abdomen pelvis with contrast   Final Result by Angelia Gonzalez MD (02/14 2329)      1  Dilated appendix with surrounding fat stranding and fluid compatible with appendicitis  The appendiceal wall is hyperemic however there is focal hypoenhancement of the appendiceal tip which may indicate impending rupture     2   The adjacent proximal sigmoid colon is focally thickened, likely reactive  3   Multiple nodules in the left adrenal gland the largest measuring 2 8 cm, indeterminate  Further evaluation with MRI of the abdomen with and without contrast with adrenal mass protocol is recommended  4   Other findings as above  I personally discussed this study with Vanessa Meyer on 2/14/2021 at 11:29 PM       Workstation performed: [id number]                    Procedures  Procedures         ED Course  ED Course as of Feb 15 0013   Jagdish Limb Feb 14, 2021 2104 CBC and differential(!)   2104 Urine Macroscopic, POC(!)   2120 Comprehensive metabolic panel(!)   3746 Lipase: 102   2137 Urine Microscopic(!)   2327 Radiology called to report acute appendicitis, 1 2cm dilation with non-enhancing tip concerning for impending rupture  Tiger text sent to 1st call surgical resident who is on their way to ED to see patient now  5 CT abdomen pelvis with contrast                             SBIRT 22yo+      Most Recent Value   SBIRT (25 yo +)   In order to provide better care to our patients, we are screening all of our patients for alcohol and drug use  Would it be okay to ask you these screening questions? Yes Filed at: 02/14/2021 2020   Initial Alcohol Screen: US AUDIT-C    1  How often do you have a drink containing alcohol?  0 Filed at: 02/14/2021 2020   2  How many drinks containing alcohol do you have on a typical day you are drinking? 0 Filed at: 02/14/2021 2020   3b  FEMALE Any Age, or MALE 65+: How often do you have 4 or more drinks on one occassion? 0 Filed at: 02/14/2021 2020   Audit-C Score  0 Filed at: 02/14/2021 2020   RUIZ: How many times in the past year have you    Used an illegal drug or used a prescription medication for non-medical reasons?   Never Filed at: 02/14/2021 2020                    MDM  Number of Diagnoses or Management Options  Diagnosis management comments: Patient is a 80 y/o male presenting to the ED for evaluation of RLQ abdominal pain, decreased appetite and constipation x4 days  Labs notable for WBC 12 72 but otherwise non-contributing  CT a/p interpretation: Dilated appendix with surrounding fat stranding and fluid compatible with appendicitis  The appendiceal wall is hyperemic however there is focal hypoenhancement of the appendiceal tip which may indicate impending rupture  The case was discussed with General Surgery and patient's admission status was agreed to be observation status to the service of Dr Mir Holden  The management plan was discussed with patient and all questions were answered  Amount and/or Complexity of Data Reviewed  Clinical lab tests: ordered and reviewed  Tests in the radiology section of CPT®: ordered and reviewed  Discussion of test results with the performing providers: yes  Discuss the patient with other providers: yes        Disposition  Final diagnoses:   Appendicitis     Time reflects when diagnosis was documented in both MDM as applicable and the Disposition within this note     Time User Action Codes Description Comment    2/14/2021 11:22 PM Vitaliy Goddard Add [R10 31] Right lower quadrant abdominal pain     2/14/2021 11:22 PM Kusum Cummings Add [K59 00] Constipation     2/14/2021 11:42 PM Kusum Blackburn Modify [K59 00] Constipation     2/14/2021 11:42 PM Ale Cummings Remove [R10 31] Right lower quadrant abdominal pain     2/14/2021 11:42 PM Kusum Cummings Remove [K59 00] Constipation     2/15/2021 12:07 AM Catarina Barroso Add [K37] Appendicitis       ED Disposition     ED Disposition Condition Date/Time Comment    Admit Stable Mon Feb 15, 2021 12:03 AM Case was discussed with General Surgery and the patient's admission status was agreed to be Admission Status: observation status to the service of Dr Mir Holden          Follow-up Information    None         Patient's Medications   Discharge Prescriptions    No medications on file     No discharge procedures on file      PDMP Review     None          ED Provider  Electronically Signed by           Pauline Romeo PA-C  02/15/21 0177

## 2021-02-15 NOTE — ANESTHESIA PREPROCEDURE EVALUATION
Procedure:  APPENDECTOMY LAPAROSCOPIC (N/A Abdomen)    Relevant Problems   ANESTHESIA   (+) Difficult intubation      CARDIO   (+) Coronary artery disease involving coronary bypass graft of native heart without angina pectoris   (+) Hyperlipidemia   (+) Hypertension        Physical Exam    Airway  Comment: Small MO  Mallampati score: IV  TM Distance: <3 FB  Neck ROM: limited     Dental   No notable dental hx     Cardiovascular  Rhythm: regular, Rate: normal, Cardiovascular exam normal    Pulmonary  Pulmonary exam normal Breath sounds clear to auscultation,     Other Findings        Anesthesia Plan  ASA Score- 3 Emergent    Anesthesia Type- general with ASA Monitors  Additional Monitors:   Airway Plan: ETT  Plan Factors-Exercise tolerance (METS): >4 METS  Chart reviewed  Existing labs reviewed  Patient summary reviewed  Patient is not a current smoker  Patient not instructed to abstain from smoking on day of procedure  Patient did not smoke on day of surgery  Induction- intravenous  Postoperative Plan- Plan for postoperative opioid use  Informed Consent- Anesthetic plan and risks discussed with patient  I personally reviewed this patient with the CRNA  Discussed and agreed on the Anesthesia Plan with the CRNA  Alex Luis

## 2021-02-18 ENCOUNTER — TELEPHONE (OUTPATIENT)
Dept: SURGERY | Facility: CLINIC | Age: 71
End: 2021-02-18

## 2021-02-18 NOTE — TELEPHONE ENCOUNTER
Two day post op call  Spoke to patient  Patient is doing well  Pain well controlled, patient ambulating well  No questions or concerns  Reminded patient of post op appointment and to call if there are any questions or concerns prior to appointment

## 2021-03-02 ENCOUNTER — OFFICE VISIT (OUTPATIENT)
Dept: SURGERY | Facility: CLINIC | Age: 71
End: 2021-03-02

## 2021-03-02 VITALS
RESPIRATION RATE: 16 BRPM | WEIGHT: 224 LBS | SYSTOLIC BLOOD PRESSURE: 134 MMHG | HEIGHT: 70 IN | HEART RATE: 76 BPM | TEMPERATURE: 97.8 F | BODY MASS INDEX: 32.07 KG/M2 | DIASTOLIC BLOOD PRESSURE: 86 MMHG

## 2021-03-02 DIAGNOSIS — Z90.49 STATUS POST LAPAROSCOPIC APPENDECTOMY: ICD-10-CM

## 2021-03-02 DIAGNOSIS — E27.8 ADRENAL NODULE (HCC): Primary | ICD-10-CM

## 2021-03-02 PROCEDURE — 99024 POSTOP FOLLOW-UP VISIT: CPT | Performed by: SURGERY

## 2021-03-02 NOTE — ASSESSMENT & PLAN NOTE
Incidental finding of a 2 8 centimeter indeterminate nodule on his left adrenal gland on CT scan  Recommend MRI  I discussed with him and recommend MRI in the next month or 2 for evaluation  He stated he will call back to schedule order follow-up with his primary care physician    To schedule

## 2021-03-02 NOTE — ASSESSMENT & PLAN NOTE
Doing well from surgery  His pain is well controlled  Tolerating regular diet  Denies fevers or chills  Postoperative  instructions were provided  His pathology revealed suppurative appendicitis with small perforation  No evidence of malignancy  He will follow-up p r n

## 2021-03-02 NOTE — PROGRESS NOTES
Assessment/Plan:    Adrenal nodule (HCC)   Incidental finding of a 2 8 centimeter indeterminate nodule on his left adrenal gland on CT scan  Recommend MRI  I discussed with him and recommend MRI in the next month or 2 for evaluation  He stated he will call back to schedule order follow-up with his primary care physician  To schedule    Status post laparoscopic appendectomy   Doing well from surgery  His pain is well controlled  Tolerating regular diet  Denies fevers or chills  Postoperative  instructions were provided  His pathology revealed suppurative appendicitis with small perforation  No evidence of malignancy  He will follow-up p r n  Diagnoses and all orders for this visit:    Adrenal nodule ([address] )    Status post laparoscopic appendectomy          Subjective:      Patient ID: Mk Angel is a 79 y o  male      HPI        Review of Systems      Objective:      /86   Pulse 76   Temp 97 8 °F (36 6 °C)   Resp 16   Ht 5' 10" (1 778 m)   Wt 102 kg (224 lb)   BMI 32 14 kg/m²          Physical Exam

## 2022-12-04 ENCOUNTER — OFFICE VISIT (OUTPATIENT)
Dept: URGENT CARE | Facility: CLINIC | Age: 72
End: 2022-12-04

## 2022-12-04 VITALS
HEART RATE: 70 BPM | BODY MASS INDEX: 32.21 KG/M2 | RESPIRATION RATE: 18 BRPM | WEIGHT: 225 LBS | HEIGHT: 70 IN | OXYGEN SATURATION: 97 % | TEMPERATURE: 98.2 F | SYSTOLIC BLOOD PRESSURE: 133 MMHG | DIASTOLIC BLOOD PRESSURE: 77 MMHG

## 2022-12-04 DIAGNOSIS — J02.8 ACUTE PHARYNGITIS DUE TO OTHER SPECIFIED ORGANISMS: ICD-10-CM

## 2022-12-04 DIAGNOSIS — R05.1 ACUTE COUGH: ICD-10-CM

## 2022-12-04 DIAGNOSIS — U07.1 COVID-19: Primary | ICD-10-CM

## 2022-12-04 DIAGNOSIS — Z86.16 HISTORY OF COVID-19: ICD-10-CM

## 2022-12-04 DIAGNOSIS — J06.9 VIRAL UPPER RESPIRATORY TRACT INFECTION WITH COUGH: ICD-10-CM

## 2022-12-04 LAB
S PYO AG THROAT QL: NEGATIVE
SARS-COV-2 AG UPPER RESP QL IA: POSITIVE
VALID CONTROL: ABNORMAL

## 2022-12-04 RX ORDER — AMLODIPINE BESYLATE 2.5 MG/1
2.5 TABLET ORAL EVERY EVENING
COMMUNITY
Start: 2022-10-18

## 2022-12-04 NOTE — RESULT ENCOUNTER NOTE
Pt is aware of + covid results at time of visit  He states that he has paxlovid from last covid dx that he has not taken  He is instructed to call PCP for follow up tomorrow

## 2022-12-04 NOTE — PATIENT INSTRUCTIONS
Your covid is positive  This is unsure if this is from September or a new variant of the covid  You are to call your PCP tomorrow and discuss  Your strep A is negative  You have a throat culture pending  You are to download [employer] for the results in 3-4 days  You will be notified if the results are + and an antibiotic will be called in for you  You are to do warm salt water gargles 4 x daily  Drink warm tea with honey and lemon  Take tylenol or motrin as able for pain or fever  Chloraseptic throat spray, cough drops  Do not share utensils  Change your tooth brush in 3 days  Continue with OTC cough cold relief products - monitor meds for blood pressure rising side effects  Corcidin HBP is safest for cardiac disease or HTN     Follow up with your PCP in 2-3 days  Go to the ED if symptoms worsen

## 2022-12-04 NOTE — PROGRESS NOTES
[facility]        NAME: Akiko Reid is a 67 y o  male  : 1950    MRN: [id number]  DATE: 2022  TIME: 2:28 PM    Assessment and Plan   COVID-19 [U07 1]  1  COVID-19        2  Viral upper respiratory tract infection with cough        3  Acute pharyngitis due to other specified organisms  POCT rapid strepA    Throat culture      4  Acute cough  Poct Covid 19 Rapid Antigen Test    Covid/Flu-Office Collect      5  History of COVID-19              Patient Instructions       Follow up with PCP in 3-5 days  Proceed to  ER if symptoms worsen  Your covid is positive  This is unsure if this is from September or a new variant of the covid  You are to call your PCP tomorrow and discuss  Your strep A is negative  You have a throat culture pending  You are to download  [employer] for the results in 3-4 days  You will be notified if the results are + and an antibiotic will be called in for you  You are to do warm salt water gargles 4 x daily  Drink warm tea with honey and lemon  Take tylenol or motrin as able for pain or fever  Chloraseptic throat spray, cough drops  Do not share utensils  Change your tooth brush in 3 days  Continue with OTC cough cold relief products - monitor meds for blood pressure rising side effects  Corcidin HBP is safest for cardiac disease or HTN  Follow up with your PCP in 2-3 days  Go to the ED if symptoms worsen        Chief Complaint     Chief Complaint   Patient presents with   • Cold Like Symptoms     Patient c/o cough, head congestion, and sore throat that started 4 days ago  History of Present Illness       This is a 67year old male who states has had head cold symptoms, non productive cough but slightly productive in the mornings, sorethroat that started 4 days ago  Temp of 99  No n/v/d or flu like symptoms  He states has been taking OTC products for symptoms with some relief  He states had covid in September           Review of Systems Review of Systems   Constitutional: Positive for fever  HENT: Positive for congestion, rhinorrhea and sore throat  Eyes: Negative  Respiratory: Positive for cough  Cardiovascular: Negative  Gastrointestinal: Negative  Endocrine: Negative  Genitourinary: Negative  Musculoskeletal: Negative  Skin: Negative  Allergic/Immunologic: Negative  Neurological: Negative  Hematological: Negative  Psychiatric/Behavioral: Negative  Current Medications       Current Outpatient Medications:   •  amLODIPine (NORVASC) 2 5 mg tablet, Take 2 5 mg by mouth every evening, Disp: , Rfl:   •  aspirin 81 mg chewable tablet, Chew 81 mg daily, Disp: , Rfl:   •  metoprolol succinate (TOPROL-XL) 50 mg 24 hr tablet, Take 50 mg by mouth daily, Disp: , Rfl:   •  simvastatin (ZOCOR) 40 mg tablet, Take 40 mg by mouth daily at bedtime, Disp: , Rfl:     Current Allergies     Allergies as of 12/04/2022   • (No Known Allergies)            The following portions of the patient's history were reviewed and updated as appropriate: allergies, current medications, past family history, past medical history, past social history, past surgical history and problem list      Past Medical History:   Diagnosis Date   • Hypercholesterolemia    • Hypertension        Past Surgical History:   Procedure Laterality Date   • CORONARY ARTERY BYPASS GRAFT     • CA LAP,APPENDECTOMY N/A 2/15/2021    Procedure: APPENDECTOMY LAPAROSCOPIC;  Surgeon: Luis Kruger MD;  Location: [facility];  Service: General       History reviewed  No pertinent family history  Medications have been verified  Objective   /77   Pulse 70   Temp 98 2 °F (36 8 °C) (Temporal)   Resp 18   Ht 5' 10" (1 778 m)   Wt 102 kg (225 lb)   SpO2 97%   BMI 32 28 kg/m²   No LMP for male patient  Physical Exam     Physical Exam  Vitals and nursing note reviewed  Constitutional:       General: He is not in acute distress  Appearance: Normal appearance  He is obese  He is not ill-appearing, toxic-appearing or diaphoretic  HENT:      Head: Normocephalic and atraumatic  Right Ear: Tympanic membrane and ear canal normal       Left Ear: Tympanic membrane and ear canal normal       Nose: Congestion present  No rhinorrhea  Mouth/Throat:      Mouth: Mucous membranes are moist       Pharynx: Oropharynx is clear  Posterior oropharyngeal erythema present  No oropharyngeal exudate  Eyes:      Extraocular Movements: Extraocular movements intact  Cardiovascular:      Rate and Rhythm: Normal rate and regular rhythm  Pulses: Normal pulses  Heart sounds: Normal heart sounds  No murmur heard  Pulmonary:      Effort: Pulmonary effort is normal  No respiratory distress  Breath sounds: Normal breath sounds  No stridor  No wheezing, rhonchi or rales  Chest:      Chest wall: No tenderness  Musculoskeletal:         General: Normal range of motion  Cervical back: Normal range of motion and neck supple  Skin:     General: Skin is warm and dry  Capillary Refill: Capillary refill takes less than 2 seconds  Neurological:      General: No focal deficit present  Mental Status: He is alert and oriented to person, place, and time  Psychiatric:         Mood and Affect: Mood normal          Behavior: Behavior normal          Thought Content: Thought content normal          Judgment: Judgment normal          Strep A negative  Flu/covid sent  poct covid is positive  Unsure if residual from covid 9/2022 or new variant  Pt instructed to call PCP and discuss  Pt states he was prescribed paxlovid in September and never took it

## 2022-12-05 LAB
FLUAV RNA RESP QL NAA+PROBE: NEGATIVE
FLUBV RNA RESP QL NAA+PROBE: NEGATIVE
SARS-COV-2 RNA RESP QL NAA+PROBE: NEGATIVE

## 2022-12-06 LAB — BACTERIA THROAT CULT: NORMAL

## 2023-11-30 ENCOUNTER — OFFICE VISIT (OUTPATIENT)
Dept: URGENT CARE | Facility: CLINIC | Age: 73
End: 2023-11-30
Payer: MEDICARE

## 2023-11-30 VITALS
HEIGHT: 70 IN | RESPIRATION RATE: 18 BRPM | WEIGHT: 225 LBS | TEMPERATURE: 97.4 F | SYSTOLIC BLOOD PRESSURE: 130 MMHG | DIASTOLIC BLOOD PRESSURE: 74 MMHG | HEART RATE: 68 BPM | BODY MASS INDEX: 32.21 KG/M2 | OXYGEN SATURATION: 98 %

## 2023-11-30 DIAGNOSIS — J02.9 SORE THROAT: Primary | ICD-10-CM

## 2023-11-30 LAB — S PYO AG THROAT QL: NEGATIVE

## 2023-11-30 PROCEDURE — G0463 HOSPITAL OUTPT CLINIC VISIT: HCPCS | Performed by: NURSE PRACTITIONER

## 2023-11-30 PROCEDURE — 87070 CULTURE OTHR SPECIMN AEROBIC: CPT | Performed by: NURSE PRACTITIONER

## 2023-11-30 PROCEDURE — 99213 OFFICE O/P EST LOW 20 MIN: CPT | Performed by: NURSE PRACTITIONER

## 2023-11-30 PROCEDURE — 87880 STREP A ASSAY W/OPTIC: CPT | Performed by: NURSE PRACTITIONER

## 2023-11-30 NOTE — PROGRESS NOTES
[facility]        NAME: Jodie Mancini is a 68 y.o. male  : 1950    MRN: [id number]  DATE: 2023  TIME: 12:54 PM    Assessment and Plan   Sore throat [J02.9]  1. Sore throat  POCT rapid strepA    Throat culture        Rapid strep negative, will send culture    Patient Instructions     Patient Instructions   Drink plenty of fluids, rest, saltwater gargles, lozenges, hot tea with honey. Tylenol for pain. If you develop a prolonged high fever, difficulty breathing, swallowing, managing secretions, decreased fluid intake, or urination, any new or concerning symptoms please return or proceed ER. Recommend following up with PCP in 3-5 days. Chief Complaint     Chief Complaint   Patient presents with    Sore Throat     Patient c/o sore throat that started on Monday. History of Present Illness       Sore Throat   This is a new problem. Episode onset: 4 days ago. The problem has been unchanged. Neither side of throat is experiencing more pain than the other. There has been no fever. The pain is mild. Associated symptoms include swollen glands. Pertinent negatives include no abdominal pain, congestion, coughing, diarrhea, ear discharge, ear pain, headaches, hoarse voice, plugged ear sensation, neck pain, shortness of breath, stridor, trouble swallowing or vomiting. He has had no exposure to strep or mono. He has tried acetaminophen for the symptoms. The treatment provided mild relief. Review of Systems   Review of Systems   Constitutional:  Negative for chills, diaphoresis, fatigue and fever. HENT:  Positive for sore throat. Negative for congestion, ear discharge, ear pain, facial swelling, hoarse voice, mouth sores, postnasal drip, rhinorrhea, sinus pressure, sinus pain and trouble swallowing. Eyes:  Negative for photophobia and visual disturbance. Respiratory:  Negative for cough, chest tightness, shortness of breath and stridor.     Cardiovascular:  Negative for chest pain. Gastrointestinal:  Negative for abdominal pain, diarrhea, nausea and vomiting. Genitourinary: Negative. Musculoskeletal:  Negative for arthralgias, back pain, joint swelling, myalgias, neck pain and neck stiffness. Skin:  Negative for rash. Neurological:  Negative for dizziness, facial asymmetry, weakness, light-headedness, numbness and headaches. Current Medications       Current Outpatient Medications:     amLODIPine (NORVASC) 2.5 mg tablet, Take 2.5 mg by mouth every evening, Disp: , Rfl:     aspirin 81 mg chewable tablet, Chew 81 mg daily, Disp: , Rfl:     metoprolol succinate (TOPROL-XL) 50 mg 24 hr tablet, Take 50 mg by mouth daily, Disp: , Rfl:     simvastatin (ZOCOR) 40 mg tablet, Take 40 mg by mouth daily at bedtime, Disp: , Rfl:     Current Allergies     Allergies as of 11/30/2023    (No Known Allergies)            The following portions of the patient's history were reviewed and updated as appropriate: allergies, current medications, past family history, past medical history, past social history, past surgical history and problem list.     Past Medical History:   Diagnosis Date    Hypercholesterolemia     Hypertension        Past Surgical History:   Procedure Laterality Date    CORONARY ARTERY BYPASS GRAFT      MA LAPAROSCOPIC APPENDECTOMY N/A 2/15/2021    Procedure: APPENDECTOMY LAPAROSCOPIC;  Surgeon: Veena Styles MD;  Location: [facility];  Service: General       History reviewed. No pertinent family history. Medications have been verified. Objective   /74   Pulse 68   Temp (!) 97.4 °F (36.3 °C) (Temporal)   Resp 18   Ht 5' 10" (1.778 m)   Wt 102 kg (225 lb)   SpO2 98%   BMI 32.28 kg/m²   No LMP for male patient. Physical Exam     Physical Exam  Constitutional:       General: He is not in acute distress. Appearance: He is well-developed. HENT:      Head: Normocephalic and atraumatic.       Right Ear: Hearing, tympanic membrane, ear canal and external ear normal.      Left Ear: Hearing, tympanic membrane, ear canal and external ear normal.      Nose: Nose normal.      Mouth/Throat:      Mouth: Mucous membranes are moist.      Pharynx: Uvula midline. Posterior oropharyngeal erythema (mild) present. No pharyngeal swelling, oropharyngeal exudate or uvula swelling. Tonsils: No tonsillar exudate or tonsillar abscesses. 1+ on the right. 1+ on the left. Cardiovascular:      Rate and Rhythm: Regular rhythm. Pulses: Normal pulses. Heart sounds: Normal heart sounds, S1 normal and S2 normal.   Pulmonary:      Effort: Pulmonary effort is normal.      Breath sounds: Normal breath sounds. Lymphadenopathy:      Cervical: No cervical adenopathy. Skin:     General: Skin is warm and dry. Capillary Refill: Capillary refill takes less than 2 seconds. Neurological:      Mental Status: He is alert and oriented to person, place, and time.

## 2023-11-30 NOTE — PATIENT INSTRUCTIONS
Drink plenty of fluids, rest, saltwater gargles, lozenges, hot tea with honey. Tylenol for pain. If you develop a prolonged high fever, difficulty breathing, swallowing, managing secretions, decreased fluid intake, or urination, any new or concerning symptoms please return or proceed ER. Recommend following up with PCP in 3-5 days.

## 2023-12-02 LAB — BACTERIA THROAT CULT: NORMAL

## (undated) DEVICE — SUT MONOCRYL 4-0 PS-2 27 IN Y426H

## (undated) DEVICE — SUT VICRYL 0 UR-6 27 IN J603H

## (undated) DEVICE — ADHESIVE SKIN HIGH VISCOSITY EXOFIN 1ML

## (undated) DEVICE — INTENDED FOR TISSUE SEPARATION, AND OTHER PROCEDURES THAT REQUIRE A SHARP SURGICAL BLADE TO PUNCTURE OR CUT.: Brand: BARD-PARKER SAFETY BLADES SIZE 11, STERILE

## (undated) DEVICE — PMI DISPOSABLE PUNCTURE CLOSURE DEVICE / SUTURE GRASPER: Brand: PMI

## (undated) DEVICE — CHLORAPREP HI-LITE 26ML ORANGE

## (undated) DEVICE — TROCAR: Brand: KII FIOS FIRST ENTRY

## (undated) DEVICE — GLOVE INDICATOR PI UNDERGLOVE SZ 8 BLUE

## (undated) DEVICE — IRRIG ENDO FLO TUBING

## (undated) DEVICE — ALLENTOWN LAP CHOLE APP PACK: Brand: CARDINAL HEALTH

## (undated) DEVICE — GLOVE SRG BIOGEL ECLIPSE 7.5

## (undated) DEVICE — 3000CC GUARDIAN II: Brand: GUARDIAN

## (undated) DEVICE — 2000CC GUARDIAN II: Brand: GUARDIAN

## (undated) DEVICE — BLUE HEAT SCOPE WARMER

## (undated) DEVICE — HARMONIC ACE 5MM DIAMETER SHEARS 36CM SHAFT LENGTH + ADAPTIVE TISSUE TECHNOLOGY FOR USE WITH GENERATOR G11: Brand: HARMONIC ACE

## (undated) DEVICE — DRAPE EQUIPMENT RF WAND

## (undated) DEVICE — ECHELON FLEX 60 ARTICULATING ENDOSCOPIC LINEAR CUTTER (NO CARTRIDGE): Brand: ECHELON FLEX ENDOPATH

## (undated) DEVICE — ENDOPATH ECHELON ENDOSCOPIC LINEAR CUTTER RELOADS, BLUE, 60MM: Brand: ECHELON ENDOPATH

## (undated) DEVICE — PENCIL ELECTROSURG E-Z CLEAN -0035H

## (undated) DEVICE — TISSUE RETRIEVAL SYSTEM: Brand: INZII RETRIEVAL SYSTEM

## (undated) DEVICE — [HIGH FLOW INSUFFLATOR,  DO NOT USE IF PACKAGE IS DAMAGED,  KEEP DRY,  KEEP AWAY FROM SUNLIGHT,  PROTECT FROM HEAT AND RADIOACTIVE SOURCES.]: Brand: PNEUMOSURE

## (undated) DEVICE — SCD SEQUENTIAL COMPRESSION COMFORT SLEEVE MEDIUM KNEE LENGTH: Brand: KENDALL SCD

## (undated) DEVICE — TROCAR: Brand: KII SLEEVE